# Patient Record
Sex: MALE | Race: WHITE | HISPANIC OR LATINO | Employment: OTHER | URBAN - METROPOLITAN AREA
[De-identification: names, ages, dates, MRNs, and addresses within clinical notes are randomized per-mention and may not be internally consistent; named-entity substitution may affect disease eponyms.]

---

## 2018-12-25 ENCOUNTER — HOSPITAL ENCOUNTER (EMERGENCY)
Facility: HOSPITAL | Age: 43
Discharge: HOME | End: 2018-12-25
Attending: INTERNAL MEDICINE

## 2018-12-25 VITALS
RESPIRATION RATE: 13 BRPM | WEIGHT: 96.31 LBS | SYSTOLIC BLOOD PRESSURE: 127 MMHG | TEMPERATURE: 99 F | HEART RATE: 79 BPM | OXYGEN SATURATION: 97 % | BODY MASS INDEX: 15.12 KG/M2 | DIASTOLIC BLOOD PRESSURE: 71 MMHG | HEIGHT: 67 IN

## 2018-12-25 DIAGNOSIS — E86.0 DEHYDRATION: ICD-10-CM

## 2018-12-25 DIAGNOSIS — K29.00 ACUTE SUPERFICIAL GASTRITIS WITHOUT HEMORRHAGE: Primary | ICD-10-CM

## 2018-12-25 DIAGNOSIS — R07.9 CHEST PAIN: ICD-10-CM

## 2018-12-25 DIAGNOSIS — E87.20 LACTIC ACIDOSIS: ICD-10-CM

## 2018-12-25 LAB
ALBUMIN SERPL BCP-MCNC: 4.1 G/DL
ALP SERPL-CCNC: 76 U/L
ALT SERPL W/O P-5'-P-CCNC: 104 U/L
ANION GAP SERPL CALC-SCNC: 14 MMOL/L
AST SERPL-CCNC: 55 U/L
BASOPHILS # BLD AUTO: 0.02 K/UL
BASOPHILS NFR BLD: 0.2 %
BILIRUB SERPL-MCNC: 0.5 MG/DL
BUN SERPL-MCNC: 13 MG/DL
CALCIUM SERPL-MCNC: 9.6 MG/DL
CHLORIDE SERPL-SCNC: 104 MMOL/L
CO2 SERPL-SCNC: 20 MMOL/L
CREAT SERPL-MCNC: 1 MG/DL
DIFFERENTIAL METHOD: ABNORMAL
EOSINOPHIL # BLD AUTO: 0 K/UL
EOSINOPHIL NFR BLD: 0.1 %
ERYTHROCYTE [DISTWIDTH] IN BLOOD BY AUTOMATED COUNT: 12.6 %
EST. GFR  (AFRICAN AMERICAN): >60 ML/MIN/1.73 M^2
EST. GFR  (NON AFRICAN AMERICAN): >60 ML/MIN/1.73 M^2
GLUCOSE SERPL-MCNC: 117 MG/DL
HCT VFR BLD AUTO: 43.7 %
HGB BLD-MCNC: 15.7 G/DL
LACTATE SERPL-SCNC: 4.8 MMOL/L
LIPASE SERPL-CCNC: 31 U/L
LYMPHOCYTES # BLD AUTO: 2.3 K/UL
LYMPHOCYTES NFR BLD: 27.1 %
MCH RBC QN AUTO: 31.3 PG
MCHC RBC AUTO-ENTMCNC: 35.9 G/DL
MCV RBC AUTO: 87 FL
MONOCYTES # BLD AUTO: 0.7 K/UL
MONOCYTES NFR BLD: 7.7 %
NEUTROPHILS # BLD AUTO: 5.5 K/UL
NEUTROPHILS NFR BLD: 64.9 %
PLATELET # BLD AUTO: 266 K/UL
PMV BLD AUTO: 9.8 FL
POTASSIUM SERPL-SCNC: 3.8 MMOL/L
PROCALCITONIN SERPL IA-MCNC: 0.04 NG/ML
PROT SERPL-MCNC: 8.6 G/DL
RBC # BLD AUTO: 5.01 M/UL
SODIUM SERPL-SCNC: 138 MMOL/L
TROPONIN I SERPL DL<=0.01 NG/ML-MCNC: <0.006 NG/ML
WBC # BLD AUTO: 8.52 K/UL

## 2018-12-25 PROCEDURE — 36415 COLL VENOUS BLD VENIPUNCTURE: CPT

## 2018-12-25 PROCEDURE — 96366 THER/PROPH/DIAG IV INF ADDON: CPT

## 2018-12-25 PROCEDURE — 96365 THER/PROPH/DIAG IV INF INIT: CPT

## 2018-12-25 PROCEDURE — 83690 ASSAY OF LIPASE: CPT

## 2018-12-25 PROCEDURE — 63600175 PHARM REV CODE 636 W HCPCS: Performed by: INTERNAL MEDICINE

## 2018-12-25 PROCEDURE — 84145 PROCALCITONIN (PCT): CPT

## 2018-12-25 PROCEDURE — 84484 ASSAY OF TROPONIN QUANT: CPT

## 2018-12-25 PROCEDURE — 96375 TX/PRO/DX INJ NEW DRUG ADDON: CPT

## 2018-12-25 PROCEDURE — 25000003 PHARM REV CODE 250: Performed by: INTERNAL MEDICINE

## 2018-12-25 PROCEDURE — 80053 COMPREHEN METABOLIC PANEL: CPT

## 2018-12-25 PROCEDURE — 93010 ELECTROCARDIOGRAM REPORT: CPT | Mod: ,,, | Performed by: INTERNAL MEDICINE

## 2018-12-25 PROCEDURE — 85025 COMPLETE CBC W/AUTO DIFF WBC: CPT

## 2018-12-25 PROCEDURE — 99285 EMERGENCY DEPT VISIT HI MDM: CPT | Mod: 25

## 2018-12-25 PROCEDURE — 83605 ASSAY OF LACTIC ACID: CPT

## 2018-12-25 RX ORDER — OMEPRAZOLE 20 MG/1
20 CAPSULE, DELAYED RELEASE ORAL DAILY
Qty: 30 CAPSULE | Refills: 11 | Status: SHIPPED | OUTPATIENT
Start: 2018-12-25 | End: 2019-12-25

## 2018-12-25 RX ORDER — HYDROMORPHONE HYDROCHLORIDE 2 MG/ML
1 INJECTION, SOLUTION INTRAMUSCULAR; INTRAVENOUS; SUBCUTANEOUS
Status: COMPLETED | OUTPATIENT
Start: 2018-12-25 | End: 2018-12-25

## 2018-12-25 RX ORDER — ONDANSETRON 4 MG/1
4 TABLET, FILM COATED ORAL EVERY 6 HOURS
Qty: 12 TABLET | Refills: 0 | Status: SHIPPED | OUTPATIENT
Start: 2018-12-25

## 2018-12-25 RX ADMIN — LORAZEPAM 1 MG: 2 INJECTION, SOLUTION INTRAMUSCULAR; INTRAVENOUS at 01:12

## 2018-12-25 RX ADMIN — SODIUM CHLORIDE, SODIUM LACTATE, POTASSIUM CHLORIDE, AND CALCIUM CHLORIDE 1000 ML: .6; .31; .03; .02 INJECTION, SOLUTION INTRAVENOUS at 02:12

## 2018-12-25 RX ADMIN — PROMETHAZINE HYDROCHLORIDE 25 MG: 25 INJECTION INTRAMUSCULAR; INTRAVENOUS at 01:12

## 2018-12-25 RX ADMIN — HYDROMORPHONE HYDROCHLORIDE 1 MG: 2 INJECTION INTRAMUSCULAR; INTRAVENOUS; SUBCUTANEOUS at 01:12

## 2018-12-25 RX ADMIN — SODIUM CHLORIDE 1000 ML: 0.9 INJECTION, SOLUTION INTRAVENOUS at 01:12

## 2018-12-25 NOTE — ED PROVIDER NOTES
SCRIBE #1 NOTE: I, Jakeronnie Irizarry, am scribing for, and in the presence of, Veronika Corral MD. I have scribed the entire note.       History     Chief Complaint   Patient presents with    Chest Pain     Pt c/o mid chest pain w/ SOB since 4 am with headche pain. + N/V     Review of patient's allergies indicates:  No Known Allergies      History of Present Illness     HPI    12/25/2018, 12:57 PM  History obtained from the patient and family      History of Present Illness: Deni Marcial is a 43 y.o. male patient with a PMHx of GERD who presents to the Emergency Department for evaluation of midsternal chest pain which onset suddenly this morning. Pt reports waking up with a HA and chest pain that has worsened since. Pt also mentions multiple episodes of emesis. Pt and wife at bedside deny any known cardiac hx. Symptoms are constant and moderate in severity. Exacerbated by nothing and relieved by nothing. Associated sxs include HA, nausea, emesis, and upper abdominal pain. Patient denies any fever, chills, SOB, leg swelling, palpitations,dysuria, hematuria, vision change, and all other sxs at this time. Pt denies tx PTA. No further complaints or concerns at this time.         Arrival mode: Personal vehicle    PCP: Primary Doctor No        Past Medical History:  Past Medical History:   Diagnosis Date    GERD (gastroesophageal reflux disease)        Past Surgical History:  History reviewed. No pertinent surgical history.      Family History:  History reviewed. No pertinent family history.    Social History:  Social History     Tobacco Use    Smoking status: Never Smoker    Smokeless tobacco: Never Used   Substance and Sexual Activity    Alcohol use: Yes    Drug use: Unknown    Sexual activity: Unknown        Review of Systems     Review of Systems   Constitutional: Negative for chills, diaphoresis and fever.   HENT: Negative for congestion, sore throat and trouble swallowing.    Eyes: Negative for  photophobia and visual disturbance.   Respiratory: Negative for cough and shortness of breath.    Cardiovascular: Positive for chest pain.   Gastrointestinal: Positive for abdominal pain (Epigastric), nausea and vomiting. Negative for blood in stool, constipation and diarrhea.        (-)Hematemesis   Genitourinary: Negative for dysuria, frequency, hematuria and urgency.   Neurological: Positive for headaches. Negative for dizziness, weakness, light-headedness and numbness.      Physical Exam     Initial Vitals [12/25/18 1250]   BP Pulse Resp Temp SpO2   (!) 157/92 80 (!) 22 98.5 °F (36.9 °C) (!) 94 %      MAP       --          Physical Exam  Nursing Notes and Vital Signs Reviewed.  Constitutional: Patient is in no acute distress. Well-developed and well-nourished.  Head: Atraumatic. Normocephalic.  Eyes: PERRL. EOM intact. Conjunctivae are not pale. No scleral icterus.  ENT: Mucous membranes are moist. Oropharynx is clear and symmetric.    Neck: Supple. Full ROM. No lymphadenopathy.  Cardiovascular: Regular rate. Regular rhythm.  Pulmonary/Chest: No respiratory distress. Clear to auscultation bilaterally. No wheezing or rales.  Abdominal: Soft and non-distended.  Severe epigastric tenderness.   Musculoskeletal: Moves all extremities. No obvious deformities. No edema. No calf tenderness.  Skin: Warm and dry.  Neurological:  Alert, awake, and appropriate.  Normal speech.  No acute focal neurological deficits are appreciated.  Psychiatric: Normal affect. Good eye contact. Appropriate in content.     ED Course   Procedures  ED Vital Signs:  Vitals:    12/25/18 1250 12/25/18 1301 12/25/18 1314 12/25/18 1346   BP: (!) 157/92 130/84 128/87 123/79   Pulse: 80  76 80   Resp: (!) 22   14   Temp: 98.5 °F (36.9 °C)      TempSrc:       SpO2: (!) 94%  99%    Weight:       Height:        12/25/18 1431 12/25/18 1446 12/25/18 1447 12/25/18 1450   BP: 133/81 127/73     Pulse: 85 83     Resp: 13 12     Temp:       TempSrc:       SpO2:  "95% 95%     Weight:   43.7 kg (96 lb 4.8 oz)    Height:    5' 7" (1.702 m)    12/25/18 1517 12/25/18 1524   BP: 127/71    Pulse: 79    Resp: 13    Temp:  98.9 °F (37.2 °C)   TempSrc:  Oral   SpO2: 97%    Weight:     Height:         Abnormal Lab Results:  Labs Reviewed   CBC W/ AUTO DIFFERENTIAL - Abnormal; Notable for the following components:       Result Value    MCH 31.3 (*)     All other components within normal limits   COMPREHENSIVE METABOLIC PANEL - Abnormal; Notable for the following components:    CO2 20 (*)     Glucose 117 (*)     Total Protein 8.6 (*)     AST 55 (*)      (*)     All other components within normal limits   LACTIC ACID, PLASMA - Abnormal; Notable for the following components:    Lactate (Lactic Acid) 4.8 (*)     All other components within normal limits    Narrative:     LA critical result(s) called and verbal readback obtained from Ruba Barger RN, 12/25/2018 14:46   LIPASE   PROCALCITONIN   TROPONIN I   URINALYSIS, REFLEX TO URINE CULTURE        All Lab Results:  Results for orders placed or performed during the hospital encounter of 12/25/18   CBC W/ AUTO DIFFERENTIAL   Result Value Ref Range    WBC 8.52 3.90 - 12.70 K/uL    RBC 5.01 4.60 - 6.20 M/uL    Hemoglobin 15.7 14.0 - 18.0 g/dL    Hematocrit 43.7 40.0 - 54.0 %    MCV 87 82 - 98 fL    MCH 31.3 (H) 27.0 - 31.0 pg    MCHC 35.9 32.0 - 36.0 g/dL    RDW 12.6 11.5 - 14.5 %    Platelets 266 150 - 350 K/uL    MPV 9.8 9.2 - 12.9 fL    Gran # (ANC) 5.5 1.8 - 7.7 K/uL    Lymph # 2.3 1.0 - 4.8 K/uL    Mono # 0.7 0.3 - 1.0 K/uL    Eos # 0.0 0.0 - 0.5 K/uL    Baso # 0.02 0.00 - 0.20 K/uL    Gran% 64.9 38.0 - 73.0 %    Lymph% 27.1 18.0 - 48.0 %    Mono% 7.7 4.0 - 15.0 %    Eosinophil% 0.1 0.0 - 8.0 %    Basophil% 0.2 0.0 - 1.9 %    Differential Method Automated    Comp. Metabolic Panel   Result Value Ref Range    Sodium 138 136 - 145 mmol/L    Potassium 3.8 3.5 - 5.1 mmol/L    Chloride 104 95 - 110 mmol/L    CO2 20 (L) 23 - 29 mmol/L "    Glucose 117 (H) 70 - 110 mg/dL    BUN, Bld 13 6 - 20 mg/dL    Creatinine 1.0 0.5 - 1.4 mg/dL    Calcium 9.6 8.7 - 10.5 mg/dL    Total Protein 8.6 (H) 6.0 - 8.4 g/dL    Albumin 4.1 3.5 - 5.2 g/dL    Total Bilirubin 0.5 0.1 - 1.0 mg/dL    Alkaline Phosphatase 76 55 - 135 U/L    AST 55 (H) 10 - 40 U/L     (H) 10 - 44 U/L    Anion Gap 14 8 - 16 mmol/L    eGFR if African American >60 >60 mL/min/1.73 m^2    eGFR if non African American >60 >60 mL/min/1.73 m^2   Lipase   Result Value Ref Range    Lipase 31 4 - 60 U/L   Lactic acid, plasma   Result Value Ref Range    Lactate (Lactic Acid) 4.8 (HH) 0.5 - 2.2 mmol/L   Procalcitonin   Result Value Ref Range    Procalcitonin 0.04 <0.25 ng/mL   Troponin I   Result Value Ref Range    Troponin I <0.006 0.000 - 0.026 ng/mL         Imaging Results:  Imaging Results          X-Ray Abdomen Flat And Erect (Final result)  Result time 12/25/18 13:21:50    Final result by Ernesto Adhikari MD (12/25/18 13:21:50)                 Impression:      Normal study.      Electronically signed by: Ernesto Adhikari MD  Date:    12/25/2018  Time:    13:21             Narrative:    EXAMINATION:  XR ABDOMEN FLAT AND ERECT    CLINICAL HISTORY:  Abdominal Pain;    TECHNIQUE:  Flat and erect AP views of the abdomen were performed.    COMPARISON:  None    FINDINGS:  The bowel gas pattern is within normal limits.  No abnormal calcifications identified over the collecting systems.  No acute osseous abnormality.                                 The EKG was ordered, reviewed, and independently interpreted by the ED provider.  Interpretation time: 12:45  Rate: 83 BPM  Rhythm: normal sinus rhythm  Interpretation: Normal axis. No STEMI.           The Emergency Provider reviewed the vital signs and test results, which are outlined above.     ED Discussion     2:14 PM: Re-evaluated pt. Pt was sleeping and room and reports his sxs have improved. D/w pt any concerns expressed at this time. Answered all questions. Pt  expresses understanding at this time.    Point of care ultrasound was performed to re-evaluate abnormal breathing pattern, volume status, cardiac status, evaluation for acute kidney injury and severe metabolic acidosis along with shortness of breath.    Patient was in supine position    Point of care ultrasound of the heart shows no evidence of pericardial effusion.  Normal LV and RV function.  Normal valvular heart  function.    Point of care ultrasound of the inferior vena cava shows distended inferior vena cava patient was asked to sniff which shows no fluid overload ad and CVP of about 5-10    Point of care ultrasound of the abdomen was also performed no evidence of free fluid in the abdomen.  Both kidneys looked and appeared normal without any hydronephrosis.  Bladder was full.    3:08 PM: Re-evaluated pt. Pt is resting comfortably and is in no acute distress.  D/w pt all pertinent results. D/w pt any concerns expressed at this time. Answered all questions. Pt expresses understanding at this time. Pt will be safe to send home if he is able to tolerate PO.     3:48 PM: Reassessed pt at this time. Pt is awake, alert, and in NAD. Pt states his condition has improved at this time. Discussed with pt all pertinent ED information and results. Discussed pt dx and plan of tx. Gave pt all f/u and return to the ED instructions. All questions and concerns were addressed at this time. Pt expresses understanding of information and instructions, and is comfortable with plan to discharge. Pt is stable for discharge.    I discussed with patient and/or family/caretaker that evaluation in the ED does not suggest any emergent or life threatening medical conditions requiring immediate intervention beyond what was provided in the ED, and I believe patient is safe for discharge.  Regardless, an unremarkable evaluation in the ED does not preclude the development or presence of a serious of life threatening condition. As such, patient  was instructed to return immediately for any worsening or change in current symptoms.        ED Medication(s):  Medications   sodium chloride 0.9% bolus 1,000 mL (0 mLs Intravenous Stopped 12/25/18 1445)   hydromorphone (PF) injection 1 mg (1 mg Intravenous Given 12/25/18 1340)   lorazepam (ATIVAN) injection 1 mg (1 mg Intravenous Given 12/25/18 1335)   promethazine (PHENERGAN) 25 mg in dextrose 5 % 50 mL IVPB (0 mg Intravenous Stopped 12/25/18 1525)   lactated ringers bolus 1,000 mL (0 mLs Intravenous Stopped 12/25/18 1525)       Current Discharge Medication List      START taking these medications    Details   omeprazole (PRILOSEC) 20 MG capsule Take 1 capsule (20 mg total) by mouth once daily.  Qty: 30 capsule, Refills: 11      ondansetron (ZOFRAN) 4 MG tablet Take 1 tablet (4 mg total) by mouth every 6 (six) hours.  Qty: 12 tablet, Refills: 0               Medical Decision Making:   Clinical Tests:   Lab Tests: Ordered and Reviewed  Radiological Study: Ordered and Reviewed  Medical Tests: Ordered and Reviewed             Scribe Attestation:   Scribe #1: I performed the above scribed service and the documentation accurately describes the services I performed. I attest to the accuracy of the note.     Attending:   Physician Attestation Statement for Scribe #1: I, Veronika Corral MD, personally performed the services described in this documentation, as scribed by Jake Irizarry, in my presence, and it is both accurate and complete.           Clinical Impression       ICD-10-CM ICD-9-CM   1. Acute superficial gastritis without hemorrhage K29.00 535.40   2. Chest pain R07.9 786.50   3. Dehydration E86.0 276.51   4. Lactic acidosis E87.2 276.2       Disposition:   Disposition: Discharged  Condition: Stable         Veronika Corral MD  12/25/18 4282       Veronika Corral MD  12/25/18 3228

## 2018-12-25 NOTE — ED NOTES
Pt given crackers and water for PO challenge at this time. Pt has negative response denying nausea, vomiting, pain or discomfort. Pt clear for discharge per MD order.

## 2018-12-25 NOTE — ED NOTES
Upon entry into room, patient sitting on the bottom of the bed with feet dangling, vomiting into basin being held up by family. Pt is diaphoretic, and flushed at this time, complaining of difficulty breathing.  O2 Sat 96%. Pt assisted back into bed. Will prepare medication order and prepare to administer. Will continue to monitor.

## 2018-12-25 NOTE — ED NOTES
Pt placed on 2L O2 at this time due to O2 Sat dropping to 88% after medication administration and going to sleep. Pt's O2 sat now 97%. Pt repositioned in bed. Call bell in reach. Family at bedside. Will continue to monitor.

## 2018-12-25 NOTE — ED NOTES
Pt asleep in bed in no acute distress at this time. Vital signs stable. Respirations even and unlabored. Family at bedside. Call bell in reach.

## 2022-08-15 ENCOUNTER — HOSPITAL ENCOUNTER (EMERGENCY)
Facility: HOSPITAL | Age: 47
Discharge: HOME | End: 2022-08-16
Attending: EMERGENCY MEDICINE

## 2022-08-15 VITALS
WEIGHT: 218 LBS | HEART RATE: 65 BPM | RESPIRATION RATE: 16 BRPM | TEMPERATURE: 98 F | OXYGEN SATURATION: 99 % | SYSTOLIC BLOOD PRESSURE: 121 MMHG | BODY MASS INDEX: 34.14 KG/M2 | DIASTOLIC BLOOD PRESSURE: 76 MMHG
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
## 2022-08-15 DIAGNOSIS — M54.50 LUMBAR PAIN: ICD-10-CM
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
## 2022-08-15 PROCEDURE — 96372 THER/PROPH/DIAG INJ SC/IM: CPT | Performed by: NURSE PRACTITIONER

## 2022-08-15 PROCEDURE — 96374 THER/PROPH/DIAG INJ IV PUSH: CPT

## 2022-08-15 PROCEDURE — 96375 TX/PRO/DX INJ NEW DRUG ADDON: CPT

## 2022-08-15 PROCEDURE — 25000003 PHARM REV CODE 250: Performed by: NURSE PRACTITIONER

## 2022-08-15 PROCEDURE — 99285 EMERGENCY DEPT VISIT HI MDM: CPT | Mod: 25

## 2022-08-15 PROCEDURE — 63600175 PHARM REV CODE 636 W HCPCS: Performed by: NURSE PRACTITIONER

## 2022-08-15 RX ORDER — ONDANSETRON 2 MG/ML
4 INJECTION INTRAMUSCULAR; INTRAVENOUS
Status: COMPLETED | OUTPATIENT
Start: 2022-08-15 | End: 2022-08-15

## 2022-08-15 RX ORDER — DIPHENHYDRAMINE HYDROCHLORIDE 50 MG/ML
25 INJECTION INTRAMUSCULAR; INTRAVENOUS
Status: DISCONTINUED | OUTPATIENT
Start: 2022-08-15 | End: 2022-08-15

## 2022-08-15 RX ORDER — KETOROLAC TROMETHAMINE 30 MG/ML
30 INJECTION, SOLUTION INTRAMUSCULAR; INTRAVENOUS
Status: COMPLETED | OUTPATIENT
Start: 2022-08-15 | End: 2022-08-15

## 2022-08-15 RX ORDER — HYDROMORPHONE HYDROCHLORIDE 1 MG/ML
1 INJECTION, SOLUTION INTRAMUSCULAR; INTRAVENOUS; SUBCUTANEOUS
Status: COMPLETED | OUTPATIENT
Start: 2022-08-15 | End: 2022-08-15

## 2022-08-15 RX ORDER — HYDROCODONE BITARTRATE AND ACETAMINOPHEN 7.5; 325 MG/1; MG/1
1 TABLET ORAL ONCE
Status: COMPLETED | OUTPATIENT
Start: 2022-08-15 | End: 2022-08-15

## 2022-08-15 RX ORDER — LORAZEPAM 2 MG/ML
1 INJECTION INTRAMUSCULAR
Status: COMPLETED | OUTPATIENT
Start: 2022-08-15 | End: 2022-08-15

## 2022-08-15 RX ADMIN — KETOROLAC TROMETHAMINE 30 MG: 30 INJECTION, SOLUTION INTRAMUSCULAR; INTRAVENOUS at 10:08

## 2022-08-15 RX ADMIN — ONDANSETRON 4 MG: 2 INJECTION INTRAMUSCULAR; INTRAVENOUS at 07:08

## 2022-08-15 RX ADMIN — LORAZEPAM 1 MG: 2 INJECTION INTRAMUSCULAR; INTRAVENOUS at 10:08

## 2022-08-15 RX ADMIN — HYDROCODONE BITARTRATE AND ACETAMINOPHEN 1 TABLET: 7.5; 325 TABLET ORAL at 03:08

## 2022-08-15 RX ADMIN — HYDROMORPHONE HYDROCHLORIDE 1 MG: 1 INJECTION, SOLUTION INTRAMUSCULAR; INTRAVENOUS; SUBCUTANEOUS at 07:08

## 2022-08-15 NOTE — ED PROVIDER NOTES
Encounter Date: 8/15/2022       History     Chief Complaint   Patient presents with    Fall     Pt reports severe lower back pain after falling roughly 12 feet from a roof.     46-year-old male with complaint right lower back pain after fall approximately 10 ft from roof.  Patient reports he fell off roof just prior to arrival and landed on right side of lower back.  Patient reports constant aching pain that is worse with any movement walking.  Denies neck pain.  Denies abdominal pain.  Reports mild right hip pain.        Review of patient's allergies indicates:  No Known Allergies  Past Medical History:   Diagnosis Date    GERD (gastroesophageal reflux disease)      History reviewed. No pertinent surgical history.  History reviewed. No pertinent family history.  Social History     Tobacco Use    Smoking status: Never Smoker    Smokeless tobacco: Never Used   Substance Use Topics    Alcohol use: Yes     Review of Systems   Constitutional: Negative for fever.   HENT: Negative for sore throat.    Respiratory: Negative for shortness of breath.    Cardiovascular: Negative for chest pain.   Gastrointestinal: Negative for nausea.   Genitourinary: Negative for dysuria.   Musculoskeletal: Positive for back pain.   Skin: Negative for rash.   Neurological: Negative for weakness.   Hematological: Does not bruise/bleed easily.       Physical Exam     Initial Vitals [08/15/22 1455]   BP Pulse Resp Temp SpO2   (!) 141/80 70 20 98.4 °F (36.9 °C) 97 %      MAP       --         Physical Exam    Nursing note and vitals reviewed.  Constitutional: He appears well-developed and well-nourished.   HENT:   Head: Normocephalic and atraumatic.   Eyes: Conjunctivae are normal. Pupils are equal, round, and reactive to light.   Neck: Neck supple.   Normal range of motion.  Cardiovascular: Normal rate, regular rhythm, normal heart sounds and intact distal pulses.   Pulmonary/Chest: Breath sounds normal.   Abdominal: Abdomen is soft. There  is no rebound and no guarding.   Musculoskeletal:         General: Normal range of motion.      Cervical back: Normal range of motion and neck supple.      Comments: Right lower lumbar tenderness, no thoracic tenderness, no cervical tenderness, ambulatory with limp     Neurological: He is alert.   Skin: Skin is warm and dry.   Psychiatric: He has a normal mood and affect. His behavior is normal. Thought content normal.         ED Course   Procedures  Labs Reviewed - No data to display       Imaging Results          MRI Lumbar Spine Without Contrast (In process)                X-Ray Lumbar Spine Ap And Lateral (Final result)  Result time 08/15/22 15:33:49    Final result by ISSA Null Sr., MD (08/15/22 15:33:49)                 Impression:      1. There is a suspected artifact projected over the right transverse process of L1.  2. There is deformity of the anterior superior aspect of the L3 vertebral body.  This is characteristic of remote trauma.  3. If additional imaging evaluation is clinically indicated, I recommend consideration of an MRI examination of the lumbar spine without IV contrast.      Electronically signed by: Gordon Null MD  Date:    08/15/2022  Time:    15:33             Narrative:    EXAMINATION:  XR LUMBAR SPINE AP AND LATERAL    CLINICAL HISTORY:  back pain;    COMPARISON:  None    FINDINGS:  There are 5 lumbar type vertebral bodies.  There is a suspected artifact projected over the right transverse process of L1.  There is deformity of the anterior superior aspect of the L3 vertebral body.  There is no spondylolisthesis or scoliosis. There is normal lumbar lordosis.                               X-Ray Pelvis Routine AP (Final result)  Result time 08/15/22 15:47:06    Final result by Ar Lane MD (08/15/22 15:47:06)                 Impression:      Negative study      Electronically signed by: Ar Lane MD  Date:    08/15/2022  Time:    15:47             Narrative:     EXAMINATION:  XR PELVIS ROUTINE AP    CLINICAL HISTORY:  pelvic pain;    COMPARISON:  None    FINDINGS:  Bone density and architecture are normal. No acute findings. The bony pelvis is intact.                                 Medications   HYDROcodone-acetaminophen 7.5-325 mg per tablet 1 tablet (1 tablet Oral Given 8/15/22 1529)   HYDROmorphone injection 1 mg (1 mg Intramuscular Given 8/15/22 1938)   ondansetron injection 4 mg (4 mg Intramuscular Given 8/15/22 1941)   lorazepam injection 1 mg (1 mg Intravenous Given 8/15/22 2249)   ketorolac injection 30 mg (30 mg Intravenous Given 8/15/22 2250)     Medical Decision Making:   ED Management:  Pt informed of all imaging results.  Instructed to take medications as prescribed and to follow up with a PCP for further evaluation and management.  Pt instructed to return to the ED with any worsening symptoms like bowel or bladder incontinence, saddle anesthesia, or numbness and tingling to the legs, all of which he does not have at time of discharge.  Pt and family vu and agree to plan.                      Clinical Impression:   Final diagnoses:  [W19.XXXA] Fall  [W19.XXXA] Fall, initial encounter (Primary)  [M54.50] Lumbar pain          ED Disposition Condition    Discharge Stable        ED Prescriptions     Medication Sig Dispense Start Date End Date Auth. Provider    HYDROcodone-acetaminophen (NORCO)  mg per tablet Take 1 tablet by mouth every 4 (four) hours as needed for Pain. 18 tablet 8/16/2022  Yo Christianson NP    methocarbamoL (ROBAXIN) 750 MG Tab Take 1 tablet (750 mg total) by mouth 4 (four) times daily. for 10 days 40 tablet 8/16/2022 8/26/2022 Yo Christianson NP    ketorolac (TORADOL) 10 mg tablet Take 1 tablet (10 mg total) by mouth every 6 (six) hours. for 5 days 20 tablet 8/16/2022 8/21/2022 Yo Christianson NP        Follow-up Information    None          Yo Christianson NP  08/16/22 0114

## 2022-08-15 NOTE — FIRST PROVIDER EVALUATION
Medical screening exam completed.  I have conducted a focused provider triage encounter, findings are as follows:    Brief history of present illness: 46 year old male with complaint of right lower back pain since fall 10 feet off of roof just prior to arrival.     There were no vitals filed for this visit.    Pertinent physical exam: right lower lumbar tenderness

## 2022-08-16 RX ORDER — KETOROLAC TROMETHAMINE 10 MG/1
10 TABLET, FILM COATED ORAL EVERY 6 HOURS
Qty: 20 TABLET | Refills: 0 | Status: SHIPPED | OUTPATIENT
Start: 2022-08-16 | End: 2022-08-21

## 2022-08-16 RX ORDER — HYDROCODONE BITARTRATE AND ACETAMINOPHEN 10; 325 MG/1; MG/1
1 TABLET ORAL EVERY 4 HOURS PRN
Qty: 18 TABLET | Refills: 0 | Status: SHIPPED | OUTPATIENT
Start: 2022-08-16

## 2022-08-16 RX ORDER — METHOCARBAMOL 750 MG/1
750 TABLET, FILM COATED ORAL 4 TIMES DAILY
Qty: 40 TABLET | Refills: 0 | Status: SHIPPED | OUTPATIENT
Start: 2022-08-16 | End: 2022-08-26